# Patient Record
Sex: FEMALE | Race: WHITE | NOT HISPANIC OR LATINO | Employment: FULL TIME | URBAN - METROPOLITAN AREA
[De-identification: names, ages, dates, MRNs, and addresses within clinical notes are randomized per-mention and may not be internally consistent; named-entity substitution may affect disease eponyms.]

---

## 2017-01-13 ENCOUNTER — HOSPITAL ENCOUNTER (OUTPATIENT)
Dept: MAMMOGRAPHY | Facility: HOSPITAL | Age: 62
Discharge: HOME | End: 2017-01-13
Admitting: FAMILY MEDICINE

## 2017-01-13 DIAGNOSIS — Z12.31 VISIT FOR SCREENING MAMMOGRAM: ICD-10-CM

## 2017-01-13 PROCEDURE — 77067 SCR MAMMO BI INCL CAD: CPT | Performed by: RADIOLOGY

## 2017-01-13 PROCEDURE — G0202 SCR MAMMO BI INCL CAD: HCPCS

## 2017-01-13 PROCEDURE — 77063 BREAST TOMOSYNTHESIS BI: CPT

## 2017-01-13 PROCEDURE — 77063 BREAST TOMOSYNTHESIS BI: CPT | Performed by: RADIOLOGY

## 2017-11-17 ENCOUNTER — TRANSCRIBE ORDERS (OUTPATIENT)
Dept: ADMINISTRATIVE | Facility: HOSPITAL | Age: 62
End: 2017-11-17

## 2017-11-17 DIAGNOSIS — Z12.31 VISIT FOR SCREENING MAMMOGRAM: Primary | ICD-10-CM

## 2018-01-22 ENCOUNTER — HOSPITAL ENCOUNTER (OUTPATIENT)
Dept: MAMMOGRAPHY | Facility: HOSPITAL | Age: 63
Discharge: HOME | End: 2018-01-22
Admitting: FAMILY MEDICINE

## 2018-01-22 DIAGNOSIS — Z12.31 VISIT FOR SCREENING MAMMOGRAM: ICD-10-CM

## 2018-01-22 PROCEDURE — 77067 SCR MAMMO BI INCL CAD: CPT | Performed by: RADIOLOGY

## 2018-01-22 PROCEDURE — 77067 SCR MAMMO BI INCL CAD: CPT

## 2018-01-22 PROCEDURE — 77063 BREAST TOMOSYNTHESIS BI: CPT

## 2018-01-22 PROCEDURE — 77063 BREAST TOMOSYNTHESIS BI: CPT | Performed by: RADIOLOGY

## 2018-12-20 ENCOUNTER — TRANSCRIBE ORDERS (OUTPATIENT)
Dept: ADMINISTRATIVE | Facility: HOSPITAL | Age: 63
End: 2018-12-20

## 2018-12-20 DIAGNOSIS — Z12.31 VISIT FOR SCREENING MAMMOGRAM: Primary | ICD-10-CM

## 2019-02-07 ENCOUNTER — HOSPITAL ENCOUNTER (OUTPATIENT)
Dept: MAMMOGRAPHY | Facility: HOSPITAL | Age: 64
Discharge: HOME | End: 2019-02-07
Admitting: FAMILY MEDICINE

## 2019-02-07 DIAGNOSIS — Z12.31 VISIT FOR SCREENING MAMMOGRAM: ICD-10-CM

## 2019-02-07 PROCEDURE — 77067 SCR MAMMO BI INCL CAD: CPT | Performed by: RADIOLOGY

## 2019-02-07 PROCEDURE — 77063 BREAST TOMOSYNTHESIS BI: CPT

## 2019-02-07 PROCEDURE — 77063 BREAST TOMOSYNTHESIS BI: CPT | Performed by: RADIOLOGY

## 2019-02-07 PROCEDURE — 77067 SCR MAMMO BI INCL CAD: CPT

## 2019-12-13 ENCOUNTER — TRANSCRIBE ORDERS (OUTPATIENT)
Dept: ADMINISTRATIVE | Facility: HOSPITAL | Age: 64
End: 2019-12-13

## 2019-12-13 DIAGNOSIS — Z12.31 VISIT FOR SCREENING MAMMOGRAM: Primary | ICD-10-CM

## 2020-02-24 ENCOUNTER — HOSPITAL ENCOUNTER (OUTPATIENT)
Dept: MAMMOGRAPHY | Facility: HOSPITAL | Age: 65
Discharge: HOME | End: 2020-02-24
Admitting: PHYSICIAN ASSISTANT

## 2020-02-24 DIAGNOSIS — Z12.31 VISIT FOR SCREENING MAMMOGRAM: ICD-10-CM

## 2020-02-24 PROCEDURE — 77067 SCR MAMMO BI INCL CAD: CPT

## 2020-02-24 PROCEDURE — 77067 SCR MAMMO BI INCL CAD: CPT | Performed by: RADIOLOGY

## 2020-02-24 PROCEDURE — 77063 BREAST TOMOSYNTHESIS BI: CPT

## 2020-02-24 PROCEDURE — 77063 BREAST TOMOSYNTHESIS BI: CPT | Performed by: RADIOLOGY

## 2020-10-25 PROCEDURE — U0003 INFECTIOUS AGENT DETECTION BY NUCLEIC ACID (DNA OR RNA); SEVERE ACUTE RESPIRATORY SYNDROME CORONAVIRUS 2 (SARS-COV-2) (CORONAVIRUS DISEASE [COVID-19]), AMPLIFIED PROBE TECHNIQUE, MAKING USE OF HIGH THROUGHPUT TECHNOLOGIES AS DESCRIBED BY CMS-2020-01-R: HCPCS | Performed by: NURSE PRACTITIONER

## 2021-01-04 ENCOUNTER — TRANSCRIBE ORDERS (OUTPATIENT)
Dept: ADMINISTRATIVE | Facility: HOSPITAL | Age: 66
End: 2021-01-04

## 2021-01-04 DIAGNOSIS — Z12.31 VISIT FOR SCREENING MAMMOGRAM: Primary | ICD-10-CM

## 2021-01-15 ENCOUNTER — IMMUNIZATION (OUTPATIENT)
Dept: VACCINE CLINIC | Facility: HOSPITAL | Age: 66
End: 2021-01-15

## 2021-01-15 PROCEDURE — 0002A: CPT | Performed by: FAMILY MEDICINE

## 2021-01-15 PROCEDURE — 91300 HC SARSCOV02 VAC 30MCG/0.3ML IM: CPT | Performed by: FAMILY MEDICINE

## 2021-01-15 PROCEDURE — 0001A: CPT | Performed by: FAMILY MEDICINE

## 2021-02-05 ENCOUNTER — IMMUNIZATION (OUTPATIENT)
Dept: VACCINE CLINIC | Facility: HOSPITAL | Age: 66
End: 2021-02-05

## 2021-02-05 PROCEDURE — 0002A: CPT | Performed by: INTERNAL MEDICINE

## 2021-02-05 PROCEDURE — 91300 HC SARSCOV02 VAC 30MCG/0.3ML IM: CPT | Performed by: INTERNAL MEDICINE

## 2021-03-11 ENCOUNTER — APPOINTMENT (OUTPATIENT)
Dept: MAMMOGRAPHY | Facility: HOSPITAL | Age: 66
End: 2021-03-11

## 2021-03-26 ENCOUNTER — HOSPITAL ENCOUNTER (OUTPATIENT)
Dept: MAMMOGRAPHY | Facility: HOSPITAL | Age: 66
End: 2021-03-26

## 2021-04-14 ENCOUNTER — HOSPITAL ENCOUNTER (OUTPATIENT)
Dept: MAMMOGRAPHY | Facility: HOSPITAL | Age: 66
Discharge: HOME | End: 2021-04-14
Admitting: INTERNAL MEDICINE

## 2021-04-14 DIAGNOSIS — Z12.31 VISIT FOR SCREENING MAMMOGRAM: ICD-10-CM

## 2021-04-14 PROCEDURE — 77063 BREAST TOMOSYNTHESIS BI: CPT

## 2021-04-14 PROCEDURE — 77063 BREAST TOMOSYNTHESIS BI: CPT | Performed by: RADIOLOGY

## 2021-04-14 PROCEDURE — 77067 SCR MAMMO BI INCL CAD: CPT | Performed by: RADIOLOGY

## 2021-04-14 PROCEDURE — 77067 SCR MAMMO BI INCL CAD: CPT

## 2022-01-06 ENCOUNTER — TRANSCRIBE ORDERS (OUTPATIENT)
Dept: ADMINISTRATIVE | Facility: HOSPITAL | Age: 67
End: 2022-01-06

## 2022-01-06 DIAGNOSIS — Z12.31 VISIT FOR SCREENING MAMMOGRAM: Primary | ICD-10-CM

## 2022-04-15 ENCOUNTER — HOSPITAL ENCOUNTER (OUTPATIENT)
Dept: MAMMOGRAPHY | Facility: HOSPITAL | Age: 67
Discharge: HOME | End: 2022-04-15
Admitting: PHYSICIAN ASSISTANT

## 2022-04-15 DIAGNOSIS — Z12.31 VISIT FOR SCREENING MAMMOGRAM: ICD-10-CM

## 2022-04-15 PROCEDURE — 77067 SCR MAMMO BI INCL CAD: CPT | Performed by: RADIOLOGY

## 2022-04-15 PROCEDURE — 77067 SCR MAMMO BI INCL CAD: CPT

## 2022-04-15 PROCEDURE — 77063 BREAST TOMOSYNTHESIS BI: CPT

## 2022-04-15 PROCEDURE — 77063 BREAST TOMOSYNTHESIS BI: CPT | Performed by: RADIOLOGY

## 2022-12-08 ENCOUNTER — TRANSCRIBE ORDERS (OUTPATIENT)
Dept: ADMINISTRATIVE | Facility: HOSPITAL | Age: 67
End: 2022-12-08

## 2022-12-08 DIAGNOSIS — Z12.31 VISIT FOR SCREENING MAMMOGRAM: Primary | ICD-10-CM

## 2023-04-17 ENCOUNTER — HOSPITAL ENCOUNTER (OUTPATIENT)
Dept: MAMMOGRAPHY | Facility: HOSPITAL | Age: 68
Discharge: HOME | End: 2023-04-17
Admitting: INTERNAL MEDICINE
Payer: MEDICARE

## 2023-04-17 DIAGNOSIS — Z12.31 VISIT FOR SCREENING MAMMOGRAM: ICD-10-CM

## 2023-04-17 PROCEDURE — 77063 BREAST TOMOSYNTHESIS BI: CPT | Performed by: RADIOLOGY

## 2023-04-17 PROCEDURE — 77063 BREAST TOMOSYNTHESIS BI: CPT

## 2023-04-17 PROCEDURE — 77067 SCR MAMMO BI INCL CAD: CPT | Performed by: RADIOLOGY

## 2023-04-17 PROCEDURE — 77067 SCR MAMMO BI INCL CAD: CPT

## 2023-10-25 ENCOUNTER — TRANSCRIBE ORDERS (OUTPATIENT)
Dept: ADMINISTRATIVE | Facility: HOSPITAL | Age: 68
End: 2023-10-25
Payer: MEDICARE

## 2023-10-25 DIAGNOSIS — Z12.31 VISIT FOR SCREENING MAMMOGRAM: Primary | ICD-10-CM

## 2024-01-18 ENCOUNTER — TRANSCRIBE ORDERS (OUTPATIENT)
Dept: PHYSICAL THERAPY | Facility: CLINIC | Age: 69
End: 2024-01-18
Payer: MEDICARE

## 2024-01-18 DIAGNOSIS — R42 DIZZINESS AND GIDDINESS: Primary | ICD-10-CM

## 2024-01-19 ENCOUNTER — TREATMENT (OUTPATIENT)
Dept: PHYSICAL THERAPY | Facility: CLINIC | Age: 69
End: 2024-01-19
Payer: MEDICARE

## 2024-01-19 DIAGNOSIS — M54.2 PAIN, NECK: ICD-10-CM

## 2024-01-19 DIAGNOSIS — R42 VERTIGO: Primary | ICD-10-CM

## 2024-01-19 PROCEDURE — 97110 THERAPEUTIC EXERCISES: CPT | Performed by: PHYSICAL THERAPIST

## 2024-01-19 PROCEDURE — 97162 PT EVAL MOD COMPLEX 30 MIN: CPT | Performed by: PHYSICAL THERAPIST

## 2024-01-19 NOTE — PROGRESS NOTES
Physical Therapy Initial Evaluation and Plan of Care     [facility]          [address]     Patient: Kyler Mills   : 1955  Diagnosis/ICD-10 Code:  Vertigo [R42]  Referring practitioner: Alta Johnson NP  Date of Initial Visit: 2024  Today's Date: 2024  Patient seen for 1 sessions    Subjective:     Subjective Evaluation    History of Present Illness  Mechanism of injury: Pt reports she had to work looking down quite a bit and since then she gets swimmy headed and dizzy. She was taking meds for eustachian tube dysfunction. This has been cleared up.     Pain  No pain reported         Objective    24 1200   Symptom Behavior   Type of Dizziness Funny feeling in head   Frequency of Dizziness Several Times a Day;Once a day   Duration of Dizziness Minutes;Hours   VAS Intensity (0-10) 7   Aggravating Factors Other  (looking down)   Relieving Factors Rest;Slow movements   Occulomotor Exam Fixation Present   Occular ROM Normal   Positional Testing   [place]-Hallpike Right No nystagmus   [place]-Hallpike Left No nystagmus   Horizontal Roll Test Right No nystagmus   Horizontal Roll Test Left No nystagmus     PT Neuro         Assessment & Plan       Assessment  Impairments: abnormal coordination, abnormal gait, activity intolerance, impaired balance, impaired physical strength and safety issue   Functional limitations: carrying objects, walking, standing and stooping   Assessment details: Patient is a 68 YOF that presents to clinic with dizziness that originated from excessive cervical flexion at work. Ocular ROM assessment was unremarkable and negative for symptoms. Vestibular assessment was also negative for inner ear pathology. Patient's cervical spine musculature is likely the source of her symptoms. Upon palpation of levator scapula and upper trapezius, she reported reproduction of symptoms. Patient was administered cervical stretches and self mobilizations  exercises for home performance. She will follow for orthopedic care with Alcides aNils PT.   Prognosis: fair    Goals  Plan Goals: Cervical specific goals to be completed by ortho therapist.     Plan  Therapy options: will be seen for skilled therapy services  Planned modality interventions: TENS  Planned therapy interventions: ADL retraining, balance/weight-bearing training, flexibility, gait training, manual therapy, neuromuscular re-education, motor coordination training, postural training, strengthening, stretching, therapeutic activities, home exercise program, body mechanics training and joint mobilization  Frequency: 1x week  Duration in visits: 8  Treatment plan discussed with: patient  Plan details: Patient will be seen 1x/wk x 8wks with treatment to include strengthening, stretching, manual therapy, neuromuscular re-education, along with modalities as indicated to decrease dizziness/pain and improve function.         Timed:  Manual Therapy:    0     mins  68406;  Therapeutic Exercise:    8     mins  90643;     Neuromuscular Marcin:    0    mins  28161;    Therapeutic Activity:     0     mins  16063;     Gait Trainin     mins  76397;     Electrical Stimulation:    0     mins  71500 ( );    Untimed:  Canalith Repositioning    0     mins 55846    Timed Treatment:   8   mins   Total Treatment:     40   mins    PT SIGNATURE: Jolie Nails, PT, DPT, MSCS, CDP, CSRS  [place] License #[id number]  DATE TREATMENT INITIATED: 2024      Medicare Initial Certification Certification Period: 2024thru2024  I certify that the therapy services are furnished while this patient is under my care.  The services outlined above are required by this patient, and will be reviewed every 90 days.     PHYSICIAN: Alta Johnson NP  NPI: [id number]                                         DATE:     Please sign and return via fax to [phone].   Thank you,   [facility].

## 2024-01-22 ENCOUNTER — TREATMENT (OUTPATIENT)
Dept: PHYSICAL THERAPY | Facility: CLINIC | Age: 69
End: 2024-01-22
Payer: MEDICARE

## 2024-01-22 DIAGNOSIS — M43.6 NECK STIFFNESS: ICD-10-CM

## 2024-01-22 DIAGNOSIS — M54.2 PAIN, NECK: Primary | ICD-10-CM

## 2024-01-22 NOTE — PROGRESS NOTES
Physical Therapy Daily Treatment Note   [address] [facility], [address], [facility] [address]      Patient: Kyler Mills   : 1955  Referring practitioner: Alta Johnson NP  Date of Initial Visit: Type: THERAPY  Noted: 2024  Today's Date: 2024  Patient seen for 2 sessions       Visit Diagnoses:    ICD-10-CM ICD-9-CM   1. Pain, neck  M54.2 723.1   2. Neck stiffness  M43.6 723.5       Subjective:    Patient reports her neck stiffness and dizziness has been better since Friday.  States she has been able to inc activity without an inc in symptoms.  Reports she has been compliant with her HEP.  Denies any new complaints.     Objective:    CROM: Flex 3.5cm, Ext 17cm, RR 14.5cm, LR 15cm  Inc tension/guarding B Sub-occ and Lev   MTrP Lev and UT   Hypomobile C-Sp      See Exercise, Manual, and Modality Logs for complete treatment.       A/P:    Pt is a 69yo F presenting to clinic with a hx of neck stiffness and movement related dizziness.  Vestibular involvement cleared by Jolie Nails, PT.  Referred to this clinic for formal treatment of cervicogenic dizziness.  Pt responds well to man stretching, oblique and PA mobs to the lower C-Sp and light distraction.  Concluded rx session with DN (W&IC) to the sub-occ and cervical paraspinals.  Pt verbalized resolution of symptoms following rx session.  Advised to continue with HEP and monitor symptoms.      STG: In 3wks, pt will:  1) Display ind with HEP and verbalize compliance  2) Report 75% improvement in dizziness with completion of ADLs.  3) Display CROM: Flex 2.5, Ext 18cm, RR 13, LR 14  4) Display 50% dec in cervical paraspinal mm guarding    LTG: in 8wks, pt will:  1) Discharge ind with HEP and self-management skills  2) Return to occupational demands without limitation  3) Return to recreational activities without restriction  4) Return to Adls without modification  5) Report resolution of dizziness with all activities    Treatment to consist of Manual,  posture edu, ther ex, ther act, HEP and neuro-muscular re-edu.      Timed:         Manual Therapy:    30     mins  31615;     Therapeutic Exercise:    0     mins  33606;     Neuromuscular Marcin:    0    mins  45634;    Therapeutic Activity:     0     mins  32863;     Gait Trainin     mins  30696;     Ultrasound:     0     mins  75654;    Ionto                               0    mins   76338  Self Care                       0     mins   33313      Un-Timed:  Electrical Stimulation:    0     mins  97828 ( );  Dry Needling     0     mins self-pay  Traction     0     mins 36911      Timed Treatment:   30   mins   Total Treatment:     30   mins    Tim Nails, PT  [place] License: [id number]

## 2024-01-30 ENCOUNTER — TREATMENT (OUTPATIENT)
Dept: PHYSICAL THERAPY | Facility: CLINIC | Age: 69
End: 2024-01-30
Payer: MEDICARE

## 2024-01-30 DIAGNOSIS — M54.2 PAIN, NECK: Primary | ICD-10-CM

## 2024-01-30 DIAGNOSIS — M43.6 NECK STIFFNESS: ICD-10-CM

## 2024-01-30 NOTE — PROGRESS NOTES
Physical Therapy Daily Treatment Note   [address] [facility], [address], [facility] [address]      Patient: Kyler Mills   : 1955  Referring practitioner: Alta Johnson NP  Date of Initial Visit: Type: THERAPY  Noted: 2024  Today's Date: 2024  Patient seen for 3 sessions       Visit Diagnoses:    ICD-10-CM ICD-9-CM   1. Pain, neck  M54.2 723.1   2. Neck stiffness  M43.6 723.5       Subjective:    Patient reports her neck is better but she continues to have inc symptoms.  States she has been compliant with his HEP.  States she has been compliant with her HEP.  States she had a f/u with her family medicine MD and she is concerned with Cervical Stenosis.      Objective:    Hypomobility of lower C-Sp  TTP B Cervi paraspinals C5-T1     See Exercise, Manual, and Modality Logs for complete treatment.       A/P:    Pt continues to josé manuel rx well.  Noted improved sub-occ mm tightness with man assessment.  Continued hypomobility and guarding of the lower C-Sp noted with palpation and PIVM assessments.  Responds well to combination of man st, distraction mobs and DN to the C-Sp paraspinals.  Pt verbalized improvement with cervical symptoms following rx session.  Discussed advanced imaging to rule out cervical stenosis.  Will assess josé manuel to rx next visit.   Continue with POT progressing as josé manuel.  Next appt 24    Timed:         Manual Therapy:    30     mins  42459;     Therapeutic Exercise:    0     mins  97049;     Neuromuscular Marcin:    0    mins  77814;    Therapeutic Activity:     0     mins  79276;     Gait Trainin     mins  09610;     Ultrasound:     0     mins  55542;    Ionto                               0    mins   83641  Self Care                       0     mins   72056      Un-Timed:  Electrical Stimulation:    0     mins  28322 (MC );  Traction     0     mins 59069      Timed Treatment:   30   mins   Total Treatment:     30   mins    Tim Nails PT  [place] License: [id number]

## 2024-02-05 ENCOUNTER — TREATMENT (OUTPATIENT)
Dept: PHYSICAL THERAPY | Facility: CLINIC | Age: 69
End: 2024-02-05
Payer: MEDICARE

## 2024-02-05 DIAGNOSIS — M54.2 PAIN, NECK: Primary | ICD-10-CM

## 2024-02-05 DIAGNOSIS — M43.6 NECK STIFFNESS: ICD-10-CM

## 2024-02-05 PROCEDURE — 97140 MANUAL THERAPY 1/> REGIONS: CPT | Performed by: PHYSICAL THERAPIST

## 2024-02-05 NOTE — PROGRESS NOTES
Physical Therapy Daily Treatment Note  [address] [facility], [address], [facility] [address]      Patient: Kyler Mills   : 1955  Referring practitioner: Alta Johnson NP  Date of Initial Visit: Type: THERAPY  Noted: 2024  Today's Date: 2024  Patient seen for 4 sessions       Visit Diagnoses:    ICD-10-CM ICD-9-CM   1. Pain, neck  M54.2 723.1   2. Neck stiffness  M43.6 723.5       Subjective:    Patient reports her dizziness has been minimal since last visit.  States she did have some soreness following DN.  Reports she was sick over the weekend.  Reports this occurred after her MRI and she feels it may have been related to positioning.  States she has been compliant with her HEP.  Denies any new complaints.     Objective:    Hypomobile C5-6  Mild guarding R lower paraspinals     See Exercise, Manual, and Modality Logs for complete treatment.       A/P:    Pt continues to josé manuel rx well.  Noted continued mid-C-Sp hypomobility with man assessment.  This continues to respond well to cervical mobilizations and distraction.  MM tightness improved with initial assessment.  Hold on DN per pt's request.  Will resume next visit if inc in dizziness returns.  Will assess josé manuel to rx next visit.   Continue with POT progressing as josé manuel.  Next appt 24    Timed:         Manual Therapy:    30     mins  59096;     Therapeutic Exercise:    0     mins  23337;     Neuromuscular Marcin:    0    mins  13720;    Therapeutic Activity:     0     mins  80884;     Gait Trainin     mins  63241;     Ultrasound:     0     mins  46203;    Ionto                               0    mins   99403  Self Care                       0     mins   12149      Un-Timed:  Electrical Stimulation:    0     mins  74605 ( );  Dry Needling     0     mins self-pay  Traction     0     mins 51821      Timed Treatment:   30   mins   Total Treatment:     30   mins    Tim Nails PT  [place] License: [id number]

## 2024-02-12 ENCOUNTER — TREATMENT (OUTPATIENT)
Dept: PHYSICAL THERAPY | Facility: CLINIC | Age: 69
End: 2024-02-12
Payer: MEDICARE

## 2024-02-12 DIAGNOSIS — M54.2 PAIN, NECK: Primary | ICD-10-CM

## 2024-02-12 DIAGNOSIS — M43.6 NECK STIFFNESS: ICD-10-CM

## 2024-02-21 ENCOUNTER — TREATMENT (OUTPATIENT)
Dept: PHYSICAL THERAPY | Facility: CLINIC | Age: 69
End: 2024-02-21
Payer: MEDICARE

## 2024-02-21 DIAGNOSIS — M43.6 NECK STIFFNESS: ICD-10-CM

## 2024-02-21 DIAGNOSIS — M54.2 PAIN, NECK: Primary | ICD-10-CM

## 2024-02-28 ENCOUNTER — TREATMENT (OUTPATIENT)
Dept: PHYSICAL THERAPY | Facility: CLINIC | Age: 69
End: 2024-02-28
Payer: MEDICARE

## 2024-02-28 DIAGNOSIS — M54.2 PAIN, NECK: Primary | ICD-10-CM

## 2024-02-28 DIAGNOSIS — M43.6 NECK STIFFNESS: ICD-10-CM

## 2024-02-28 NOTE — PROGRESS NOTES
Physical Therapy Daily Treatment Note  [address] [facility], [address], [facility] [address]      Patient: Kyler Mills   : 1955  Referring practitioner: Alta Johnson NP  Date of Initial Visit: Type: THERAPY  Noted: 2024  Today's Date: 2024  Patient seen for 5 sessions       Visit Diagnoses:    ICD-10-CM ICD-9-CM   1. Pain, neck  M54.2 723.1   2. Neck stiffness  M43.6 723.5       Subjective:    Patient reports she had another episode of dizziness while working out.  Reports she was completing knee ext when she noted onset of neck tightness.  Reports she has been compliant with her HEP.  Denies any new complaints.  States dizziness has resolved.      Objective:    Right C4-6 c-sp guarding     See Exercise, Manual, and Modality Logs for complete treatment.       A/P:    Pt continues to josé manuel rx well.  Noted mild inc in mm tightness at the onset of rx.  Responds mod well to cervical distraction and oblique mobilizations.  Persistent MTrP noted following man.  Completed DN to the Right cervical region with release of MTrP and pt verbalizing dec in symptoms.  Will assess josé manuel to rx next visit.   Continue with POT progressing as josé manuel.  Next appt 3/13/24    Timed:         Manual Therapy:    30     mins  67902;     Therapeutic Exercise:    0     mins  72281;     Neuromuscular Marcin:    0    mins  54257;    Therapeutic Activity:     0     mins  64265;     Gait Trainin     mins  14114;     Ultrasound:     0     mins  01021;    Ionto                               0    mins   86544  Self Care                       0     mins   45049      Un-Timed:  Electrical Stimulation:    0     mins  65854 ( );  Dry Needling     0     mins self-pay  Traction     0     mins 83965      Timed Treatment:   30   mins   Total Treatment:     30   mins    Tim Nails PT  [place] License: [id number]

## 2024-03-03 NOTE — PROGRESS NOTES
"Physical Therapy Daily Treatment Note  [address] [facility], [address], [facility] [address]      Patient: Kyler Mills   : 1955  Referring practitioner: Alta Johnson NP  Date of Initial Visit: Type: THERAPY  Noted: 2024  Today's Date: 3/2/2024  Patient seen for 5 sessions       Visit Diagnoses:    ICD-10-CM ICD-9-CM   1. Pain, neck  M54.2 723.1   2. Neck stiffness  M43.6 723.5       Subjective:    Patient reports her dizziness is much improved.  States she has been compliant with her HEP.  Reports she feels the DN performed last visit lasted longer with respect to spasm.  Reports she has no new complaints.  States she continues to feel as if her legs are \"wobbly.\"    Objective:    Mild Cervical paraspinal guarding  C5-6 hypomobility   MTrP R UT      See Exercise, Manual, and Modality Logs for complete treatment.       A/P:    Pt continues to josé manuel rx well.  Noted persistent cervical paraspinal mm guarding.  Responds well to man st and STM.  Deep MTrP R C5-6.  Performed DN to the cervical paraspinals and sub-occ mm.  Pt responds well to verbalizing of resolution of tightness and unsteadiness.  Will assess josé manuel to rx next visit.   Continue with POT progressing as josé manuel.  Next appt 24    Timed:         Manual Therapy:    30     mins  64037;     Therapeutic Exercise:    0     mins  61783;     Neuromuscular Marcin:    0    mins  56575;    Therapeutic Activity:     0     mins  15126;     Gait Trainin     mins  68499;     Ultrasound:     0     mins  15299;    Ionto                               0    mins   85130  Self Care                       0     mins   43763      Un-Timed:  Electrical Stimulation:    0     mins  83346 ( );  Dry Needling     0     mins self-pay  Traction     0     mins 91314      Timed Treatment:   30   mins   Total Treatment:     30   mins    Tim Nails PT  [place] License: [id number]      "

## 2024-03-06 NOTE — PROGRESS NOTES
Physical Therapy Daily Treatment Note  [address] [facility], [address], [facility] [address]      Patient: Kyler Mills   : 1955  Referring practitioner: Alta Johnson NP  Date of Initial Visit: Type: THERAPY  Noted: 2024  Today's Date: 3/6/2024  Patient seen for 6 sessions       Visit Diagnoses:    ICD-10-CM ICD-9-CM   1. Pain, neck  M54.2 723.1   2. Neck stiffness  M43.6 723.5       Subjective:    Patient reports her neck continues to improve.  Reports the DN and stretching is improving tension.  States she has been compliant with her HEP.  No new complaints.     Objective:    Mild Cervical paraspinal guarding  C5-6 hypomobility      See Exercise, Manual, and Modality Logs for complete treatment.       A/P:    Pt continues to josé manuel rx well.  Noted improvement in cervical mobility at the onset of rx.  Able to facilitate dec in guarding and tension with man st and DN to the C-Sp.  R UT MTrP improved.  Advised continued progression with regular exercise.  Discussed [employer] participation and encouraged attending class to promote improved balance and general disposition.  Will assess josé manuel to rx next visit.   Continue with POT progressing as josé manuel.  Next appt 24    Timed:         Manual Therapy:    30     mins  30046;     Therapeutic Exercise:    0     mins  70841;     Neuromuscular Marcin:    0    mins  20426;    Therapeutic Activity:     0     mins  56115;     Gait Trainin     mins  17938;     Ultrasound:     0     mins  95017;    Ionto                               0    mins   15358  Self Care                       0     mins   14765      Un-Timed:  Electrical Stimulation:    0     mins  56965 ( );  Dry Needling     0     mins self-pay  Traction     0     mins 70678      Timed Treatment:   30   mins   Total Treatment:     30   mins    Tim Nails PT  [place] License: [id number]

## 2024-03-13 ENCOUNTER — TREATMENT (OUTPATIENT)
Dept: PHYSICAL THERAPY | Facility: CLINIC | Age: 69
End: 2024-03-13
Payer: MEDICARE

## 2024-03-13 DIAGNOSIS — M54.2 PAIN, NECK: Primary | ICD-10-CM

## 2024-03-13 DIAGNOSIS — M43.6 NECK STIFFNESS: ICD-10-CM

## 2024-03-13 NOTE — PROGRESS NOTES
Physical Therapy Daily Treatment Note  [address] [facility], [address], [facility] [address]      Patient: Kyler Mills   : 1955  Referring practitioner: OMKAR Hinds  Date of Initial Visit: Type: THERAPY  Noted: 2024  Today's Date: 3/13/2024  Patient seen for 8 sessions       Visit Diagnoses:    ICD-10-CM ICD-9-CM   1. Pain, neck  M54.2 723.1   2. Neck stiffness  M43.6 723.5       Subjective:    Patient reports her neck has been doing well.  States she had mild dizziness this morning which she feels is related to inc use of cell phone.  Reports she has been compliant with her HEP.  Denies any new complaints.     Objective:    Right C5-6-7 paraspinal MTrP  Guarding B Sub-Occ     See Exercise, Manual, and Modality Logs for complete treatment.       A/P:    Pt continues to josé manuel rx well.  Inc guarding of the R cervical mm noted with pre-rx assessment.   Likely source of inc dizziness.  Discussed postural correction to prevent excessive loading.  Continued improvement with Man st, cervical mobs and DN.  Encouraged continues participation in recreational walking and exercises.  Will assess josé manuel to rx next visit,   Continue with POT progressing as josé manuel.  Next 3/27/24    Timed:         Manual Therapy:    30     mins  04933;     Therapeutic Exercise:    0     mins  19315;     Neuromuscular Marcin:    0    mins  32595;    Therapeutic Activity:     0     mins  09084;     Gait Trainin     mins  70136;     Ultrasound:     0     mins  67746;    Ionto                               0    mins   42339  Self Care                       0     mins   82029      Un-Timed:  Electrical Stimulation:    0     mins  62982 ( );  Dry Needling     0     mins self-pay  Traction     0     mins 29651      Timed Treatment:   30   mins   Total Treatment:     30   mins    Tim Nails, PT  [place] License: [id number]

## 2024-03-27 ENCOUNTER — TREATMENT (OUTPATIENT)
Dept: PHYSICAL THERAPY | Facility: CLINIC | Age: 69
End: 2024-03-27
Payer: MEDICARE

## 2024-03-27 DIAGNOSIS — M54.2 PAIN, NECK: Primary | ICD-10-CM

## 2024-03-27 DIAGNOSIS — M43.6 NECK STIFFNESS: ICD-10-CM

## 2024-04-16 ENCOUNTER — TREATMENT (OUTPATIENT)
Dept: PHYSICAL THERAPY | Facility: CLINIC | Age: 69
End: 2024-04-16
Payer: MEDICARE

## 2024-04-16 DIAGNOSIS — M47.812 CERVICAL SPONDYLOSIS: Primary | ICD-10-CM

## 2024-04-16 DIAGNOSIS — R27.8 MUSCULAR INCOORDINATION: ICD-10-CM

## 2024-04-16 PROCEDURE — 97140 MANUAL THERAPY 1/> REGIONS: CPT | Performed by: PHYSICAL THERAPIST

## 2024-04-16 NOTE — PROGRESS NOTES
Physical Therapy Daily Treatment Note   [address] [facility], [address], [facility] [address]      Patient: Kyler Mills   : 1955  Referring practitioner: OMKAR Hinds  Date of Initial Visit: Type: THERAPY  Noted: 2024  Today's Date: 2024  Patient seen for 9 sessions       Visit Diagnoses:    ICD-10-CM ICD-9-CM   1. Cervical spondylosis  M47.812 721.0   2. Muscular incoordination  R27.8 781.3       Subjective:    Patient reports she has been doing well.  States she has been compliant with her HEP and stretching.  Reports minimal symptoms since last visit.  Reports no new complaints.    Objective:    Mild Sub-occ mm guarding  CROM: Flex 2cm, Ext 18cm, RR 13cm, LR 12cm       See Exercise, Manual, and Modality Logs for complete treatment.       A/P:    Pt has responded well to skilled PT intervention.  Noted improved basilar tone of the cervical paraspinals with man assessment.  Continued sub-occ mm tightness which responds well to man distraction and sub-occ release.  Discussed hold on DN due to improved mm tone.  Pt was receptive.    Plan to d/c from this facility as provider is changing locations.  Will resume care at 1x/mo at [facility].  Pt to call to schedule appt.     Timed:         Manual Therapy:    30     mins  11243;     Therapeutic Exercise:    0     mins  37596;     Neuromuscular Marcin:    0    mins  43739;    Therapeutic Activity:     0     mins  64777;     Gait Trainin     mins  07016;     Ultrasound:     0     mins  23070;    Ionto                               0    mins   11081  Self Care                       0     mins   18348      Un-Timed:  Electrical Stimulation:    0     mins  25005 ( );  Dry Needling     0     mins self-pay  Traction     0     mins 87643      Timed Treatment:   30   mins   Total Treatment:     30   mins    Tim Nails, PT  [place] License: [id number]

## 2024-04-18 ENCOUNTER — HOSPITAL ENCOUNTER (OUTPATIENT)
Dept: MAMMOGRAPHY | Facility: HOSPITAL | Age: 69
Discharge: HOME | End: 2024-04-18
Admitting: INTERNAL MEDICINE
Payer: MEDICARE

## 2024-04-18 DIAGNOSIS — Z12.31 VISIT FOR SCREENING MAMMOGRAM: ICD-10-CM

## 2024-04-18 PROCEDURE — 77067 SCR MAMMO BI INCL CAD: CPT

## 2024-04-18 PROCEDURE — 77063 BREAST TOMOSYNTHESIS BI: CPT

## 2024-04-18 NOTE — PROGRESS NOTES
Physical Therapy Daily Treatment Note   [address] [facility], [address], [facility] [address]      Patient: Kyler Mills   : 1955  Referring practitioner: OMKAR Hinds  Date of Initial Visit: Type: THERAPY  Noted: 2024  Today's Date: 2024  Patient seen for 9 sessions       Visit Diagnoses:    ICD-10-CM ICD-9-CM   1. Pain, neck  M54.2 723.1   2. Neck stiffness  M43.6 723.5       Subjective:    Patient reports has been doing well overall.  States she did have an isolated instance of dizziness 2 days ago.  Reports she is unable to attribute the inc in symptoms to a particular activity or motion.  Reports she has been compliant with her HEP.  Denies any new complaints.     Objective:    CROM: Flex 3cm, Ext 19cm, RR 13, LR 13     See Exercise, Manual, and Modality Logs for complete treatment.       A/P:    Pt continues to josé manuel rx well.  Noted mild inc in Left sided paraspinal m guarding and sub-occ tension.  Pt continues to respond well to cervical mobilizations, stretching and sub-occ release.  Mild MTrP of the L paraspinal noted post man.  Completed DN to the cervical paraspinals.  Pt josé manuel well with resolution of pre-rx symptoms.  Will assess josé manuel to rx next visit.   Continue with POT progressing as josé manuel.  Next appt 24    Timed:         Manual Therapy:    30     mins  13332;     Therapeutic Exercise:    0     mins  94033;     Neuromuscular Marcin:    0    mins  51299;    Therapeutic Activity:     0     mins  09977;     Gait Trainin     mins  57965;     Ultrasound:     0     mins  20960;    Ionto                               0    mins   12772  Self Care                       0     mins   99464      Un-Timed:  Electrical Stimulation:    0     mins  82364 ( );  Dry Needling     0     mins self-pay  Traction     0     mins 88883      Timed Treatment:   30   mins   Total Treatment:     30   mins    Tim Nails PT  [place] License: [id number]

## 2024-11-06 ENCOUNTER — TRANSCRIBE ORDERS (OUTPATIENT)
Dept: ADMINISTRATIVE | Facility: HOSPITAL | Age: 69
End: 2024-11-06
Payer: MEDICARE

## 2024-11-06 DIAGNOSIS — N63.10 MASS OF RIGHT BREAST, UNSPECIFIED QUADRANT: Primary | ICD-10-CM

## 2024-11-06 DIAGNOSIS — N63.11 UNSPECIFIED LUMP IN THE RIGHT BREAST, UPPER OUTER QUADRANT: Primary | ICD-10-CM

## 2024-11-12 ENCOUNTER — HOSPITAL ENCOUNTER (OUTPATIENT)
Dept: ULTRASOUND IMAGING | Facility: HOSPITAL | Age: 69
Discharge: HOME | End: 2024-11-12
Payer: MEDICARE

## 2024-11-12 ENCOUNTER — HOSPITAL ENCOUNTER (OUTPATIENT)
Dept: MAMMOGRAPHY | Facility: HOSPITAL | Age: 69
Discharge: HOME | End: 2024-11-12
Payer: MEDICARE

## 2024-11-12 DIAGNOSIS — N63.11 UNSPECIFIED LUMP IN THE RIGHT BREAST, UPPER OUTER QUADRANT: ICD-10-CM

## 2024-11-12 LAB
NCCN CRITERIA FLAG: ABNORMAL
TYRER CUZICK SCORE: 6

## 2024-11-12 PROCEDURE — 76642 ULTRASOUND BREAST LIMITED: CPT

## 2024-11-12 PROCEDURE — 77065 DX MAMMO INCL CAD UNI: CPT

## 2024-11-12 PROCEDURE — G0279 TOMOSYNTHESIS, MAMMO: HCPCS | Performed by: RADIOLOGY

## 2024-11-12 PROCEDURE — 77065 DX MAMMO INCL CAD UNI: CPT | Performed by: RADIOLOGY

## 2024-11-12 PROCEDURE — 76642 ULTRASOUND BREAST LIMITED: CPT | Performed by: RADIOLOGY

## 2024-11-12 PROCEDURE — G0279 TOMOSYNTHESIS, MAMMO: HCPCS

## 2024-11-30 ENCOUNTER — DOCUMENTATION (OUTPATIENT)
Dept: GENETICS | Facility: HOSPITAL | Age: 69
End: 2024-11-30
Payer: MEDICARE

## 2024-11-30 NOTE — PROGRESS NOTES
Meets NCCN Criteria for Genetic Testing  Declines genetic testing? Y   Reason for decline? Could Not Reach Patient   If Reason for Decline is Previous Testing    Amb CARE     Non-CARE     Non-CARE Confirmation    Navigator Confirmed?     Patient Reported?     Elevated Tyrer-Cuzick Score ? Or Equal to 20%    Declines referral?     Reason for decline?

## 2025-03-04 ENCOUNTER — TRANSCRIBE ORDERS (OUTPATIENT)
Dept: ADMINISTRATIVE | Facility: HOSPITAL | Age: 70
End: 2025-03-04
Payer: MEDICARE

## 2025-03-04 DIAGNOSIS — Z12.31 ENCOUNTER FOR SCREENING MAMMOGRAM FOR MALIGNANT NEOPLASM OF BREAST: Primary | ICD-10-CM

## 2025-04-21 ENCOUNTER — HOSPITAL ENCOUNTER (OUTPATIENT)
Dept: MAMMOGRAPHY | Facility: HOSPITAL | Age: 70
Discharge: HOME | End: 2025-04-21
Admitting: FAMILY MEDICINE
Payer: MEDICARE

## 2025-04-21 DIAGNOSIS — Z12.31 ENCOUNTER FOR SCREENING MAMMOGRAM FOR MALIGNANT NEOPLASM OF BREAST: ICD-10-CM

## 2025-04-21 PROCEDURE — 77063 BREAST TOMOSYNTHESIS BI: CPT

## 2025-04-21 PROCEDURE — 77067 SCR MAMMO BI INCL CAD: CPT

## 2025-04-22 PROCEDURE — 77063 BREAST TOMOSYNTHESIS BI: CPT | Performed by: RADIOLOGY

## 2025-04-22 PROCEDURE — 77067 SCR MAMMO BI INCL CAD: CPT | Performed by: RADIOLOGY
